# Patient Record
Sex: FEMALE | Race: OTHER | URBAN - METROPOLITAN AREA
[De-identification: names, ages, dates, MRNs, and addresses within clinical notes are randomized per-mention and may not be internally consistent; named-entity substitution may affect disease eponyms.]

---

## 2017-08-15 ENCOUNTER — APPOINTMENT (RX ONLY)
Dept: URBAN - METROPOLITAN AREA CLINIC 70 | Facility: CLINIC | Age: 82
Setting detail: DERMATOLOGY
End: 2017-08-15

## 2017-08-15 ENCOUNTER — APPOINTMENT (RX ONLY)
Dept: URBAN - METROPOLITAN AREA CLINIC 71 | Facility: CLINIC | Age: 82
Setting detail: DERMATOLOGY
End: 2017-08-15

## 2017-08-15 DIAGNOSIS — L29.8 OTHER PRURITUS: ICD-10-CM

## 2017-08-15 DIAGNOSIS — Z71.89 OTHER SPECIFIED COUNSELING: ICD-10-CM

## 2017-08-15 DIAGNOSIS — D69.2 OTHER NONTHROMBOCYTOPENIC PURPURA: ICD-10-CM

## 2017-08-15 DIAGNOSIS — L29.89 OTHER PRURITUS: ICD-10-CM

## 2017-08-15 PROBLEM — I48.91 UNSPECIFIED ATRIAL FIBRILLATION: Status: ACTIVE | Noted: 2017-08-15

## 2017-08-15 PROBLEM — J44.9 CHRONIC OBSTRUCTIVE PULMONARY DISEASE, UNSPECIFIED: Status: ACTIVE | Noted: 2017-08-15

## 2017-08-15 PROBLEM — L40.0 PSORIASIS VULGARIS: Status: ACTIVE | Noted: 2017-08-15

## 2017-08-15 PROBLEM — K21.9 GASTRO-ESOPHAGEAL REFLUX DISEASE WITHOUT ESOPHAGITIS: Status: ACTIVE | Noted: 2017-08-15

## 2017-08-15 PROCEDURE — ? TREATMENT REGIMEN

## 2017-08-15 PROCEDURE — 99213 OFFICE O/P EST LOW 20 MIN: CPT

## 2017-08-15 PROCEDURE — ? COUNSELING

## 2017-08-15 ASSESSMENT — LOCATION SIMPLE DESCRIPTION DERM
LOCATION SIMPLE: RIGHT ELBOW
LOCATION SIMPLE: LEFT THIGH
LOCATION SIMPLE: RIGHT ELBOW
LOCATION SIMPLE: CHEST
LOCATION SIMPLE: RIGHT SHOULDER
LOCATION SIMPLE: ABDOMEN
LOCATION SIMPLE: RIGHT FOREARM
LOCATION SIMPLE: RIGHT POSTERIOR UPPER ARM
LOCATION SIMPLE: POSTERIOR NECK
LOCATION SIMPLE: RIGHT THIGH
LOCATION SIMPLE: RIGHT POSTERIOR THIGH
LOCATION SIMPLE: LEFT ELBOW
LOCATION SIMPLE: RIGHT FOREARM
LOCATION SIMPLE: LEFT UPPER BACK
LOCATION SIMPLE: LEFT UPPER BACK
LOCATION SIMPLE: LEFT WRIST
LOCATION SIMPLE: LEFT ELBOW
LOCATION SIMPLE: RIGHT POSTERIOR THIGH
LOCATION SIMPLE: LEFT THIGH
LOCATION SIMPLE: LEFT FOREHEAD
LOCATION SIMPLE: RIGHT THIGH
LOCATION SIMPLE: LEFT POSTERIOR UPPER ARM
LOCATION SIMPLE: POSTERIOR NECK
LOCATION SIMPLE: LEFT POSTERIOR UPPER ARM
LOCATION SIMPLE: RIGHT SHOULDER
LOCATION SIMPLE: LEFT FOREARM
LOCATION SIMPLE: LEFT FOREHEAD
LOCATION SIMPLE: LEFT WRIST
LOCATION SIMPLE: LEFT FOREARM
LOCATION SIMPLE: CHEST
LOCATION SIMPLE: RIGHT POSTERIOR UPPER ARM
LOCATION SIMPLE: ABDOMEN

## 2017-08-15 ASSESSMENT — LOCATION DETAILED DESCRIPTION DERM
LOCATION DETAILED: LEFT PROXIMAL POSTERIOR UPPER ARM
LOCATION DETAILED: RIGHT DISTAL POSTERIOR UPPER ARM
LOCATION DETAILED: STERNAL NOTCH
LOCATION DETAILED: MID POSTERIOR NECK
LOCATION DETAILED: RIGHT POSTERIOR SHOULDER
LOCATION DETAILED: LEFT PROXIMAL POSTERIOR UPPER ARM
LOCATION DETAILED: LEFT DORSAL WRIST
LOCATION DETAILED: MID POSTERIOR NECK
LOCATION DETAILED: LEFT INFERIOR MEDIAL FOREHEAD
LOCATION DETAILED: LEFT SUPERIOR MEDIAL UPPER BACK
LOCATION DETAILED: EPIGASTRIC SKIN
LOCATION DETAILED: LEFT INFERIOR MEDIAL FOREHEAD
LOCATION DETAILED: LEFT PROXIMAL DORSAL FOREARM
LOCATION DETAILED: LEFT DISTAL DORSAL FOREARM
LOCATION DETAILED: EPIGASTRIC SKIN
LOCATION DETAILED: RIGHT ANTERIOR DISTAL THIGH
LOCATION DETAILED: LEFT DISTAL DORSAL FOREARM
LOCATION DETAILED: LEFT SUPERIOR MEDIAL UPPER BACK
LOCATION DETAILED: RIGHT ELBOW
LOCATION DETAILED: RIGHT PROXIMAL LATERAL POSTERIOR THIGH
LOCATION DETAILED: STERNAL NOTCH
LOCATION DETAILED: LEFT ANTERIOR PROXIMAL THIGH
LOCATION DETAILED: LEFT DORSAL WRIST
LOCATION DETAILED: LEFT ELBOW
LOCATION DETAILED: RIGHT DISTAL POSTERIOR UPPER ARM
LOCATION DETAILED: RIGHT ELBOW
LOCATION DETAILED: RIGHT ANTERIOR DISTAL THIGH
LOCATION DETAILED: RIGHT DISTAL RADIAL DORSAL FOREARM
LOCATION DETAILED: LEFT PROXIMAL DORSAL FOREARM
LOCATION DETAILED: LEFT ELBOW
LOCATION DETAILED: RIGHT DISTAL RADIAL DORSAL FOREARM
LOCATION DETAILED: RIGHT POSTERIOR SHOULDER
LOCATION DETAILED: LEFT ANTERIOR PROXIMAL THIGH
LOCATION DETAILED: RIGHT PROXIMAL LATERAL POSTERIOR THIGH

## 2017-08-15 ASSESSMENT — LOCATION ZONE DERM
LOCATION ZONE: FACE
LOCATION ZONE: TRUNK
LOCATION ZONE: NECK
LOCATION ZONE: LEG
LOCATION ZONE: NECK
LOCATION ZONE: TRUNK
LOCATION ZONE: LEG
LOCATION ZONE: FACE
LOCATION ZONE: ARM
LOCATION ZONE: ARM

## 2017-08-15 NOTE — PROCEDURE: TREATMENT REGIMEN
Plan: Explained stress can play a role in itching. Patient had blood work done with Alanna Rokcwell ([phone])left voicemail to fax blood work over
Initiate Treatment: Benadryl 25mg QHS
Detail Level: Zone

## 2017-08-15 NOTE — PROCEDURE: TREATMENT REGIMEN
Initiate Treatment: Benadryl 25mg QHS
Detail Level: Zone
Plan: Explained stress can play a role in itching. Patient had blood work done with Soha Abarca ([phone])left voicemail to fax blood work over

## 2022-06-15 ENCOUNTER — APPOINTMENT (RX ONLY)
Dept: URBAN - METROPOLITAN AREA CLINIC 71 | Facility: CLINIC | Age: 87
Setting detail: DERMATOLOGY
End: 2022-06-15

## 2022-06-15 DIAGNOSIS — L29.8 OTHER PRURITUS: ICD-10-CM | Status: INADEQUATELY CONTROLLED

## 2022-06-15 DIAGNOSIS — L29.89 OTHER PRURITUS: ICD-10-CM | Status: INADEQUATELY CONTROLLED

## 2022-06-15 DIAGNOSIS — Z71.89 OTHER SPECIFIED COUNSELING: ICD-10-CM

## 2022-06-15 DIAGNOSIS — L85.3 XEROSIS CUTIS: ICD-10-CM

## 2022-06-15 PROCEDURE — ? TREATMENT REGIMEN

## 2022-06-15 PROCEDURE — ? COUNSELING

## 2022-06-15 PROCEDURE — 99203 OFFICE O/P NEW LOW 30 MIN: CPT

## 2022-06-15 PROCEDURE — ? INVENTORY

## 2022-06-15 ASSESSMENT — LOCATION ZONE DERM
LOCATION ZONE: TRUNK
LOCATION ZONE: ARM
LOCATION ZONE: LEG

## 2022-06-15 ASSESSMENT — LOCATION SIMPLE DESCRIPTION DERM
LOCATION SIMPLE: LEFT CALF
LOCATION SIMPLE: LEFT THIGH
LOCATION SIMPLE: RIGHT UPPER BACK
LOCATION SIMPLE: RIGHT FOREARM
LOCATION SIMPLE: LOWER BACK
LOCATION SIMPLE: LEFT FOREARM
LOCATION SIMPLE: ABDOMEN
LOCATION SIMPLE: CHEST

## 2022-06-15 ASSESSMENT — LOCATION DETAILED DESCRIPTION DERM
LOCATION DETAILED: RIGHT PROXIMAL DORSAL FOREARM
LOCATION DETAILED: LEFT PROXIMAL DORSAL FOREARM
LOCATION DETAILED: RIGHT SUPERIOR MEDIAL UPPER BACK
LOCATION DETAILED: LEFT PROXIMAL CALF
LOCATION DETAILED: INFERIOR LUMBAR SPINE
LOCATION DETAILED: RIGHT MEDIAL SUPERIOR CHEST
LOCATION DETAILED: PERIUMBILICAL SKIN
LOCATION DETAILED: LEFT ANTERIOR DISTAL THIGH
LOCATION DETAILED: RIGHT MEDIAL UPPER BACK

## 2022-06-15 NOTE — HPI: ITCHING
What Type Of Note Output Would You Prefer (Optional)?: Standard Output
On A Scale Of 0 To 10 How Would You Rate Your Itching?: 10
How Did Your Itching Occur?: gradual in onset  (over a period of years)
How Severe Is Your Itching?: severe
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 He states he went to an allergist and they were not helpful. Patient bathes with baby soap and dove unscented soap and all free and clear detergent. Patient states she has been on prednisone in the past from her PCP and it does help but as soon as she’s done it comes back.

## 2022-06-15 NOTE — PROCEDURE: TREATMENT REGIMEN
Detail Level: Zone
Initiate Treatment: Claritin QAM\\nBenadryl QHS\\nSarna lotion
Plan: Patient states her kidney function is normal. She states she has had recent bloodwork and everything was WNL. Will sign medical record release today for bloodwork from PCP and Allergist. May consider a therapeutic trial of UVB treatment. Patient will bring records from [facility], she states they diagnosed her with lichen planus years ago.
Continue Regimen: Cerave moisturizing cream BID

## 2022-06-15 NOTE — PROCEDURE: MIPS QUALITY
Quality 110: Preventive Care And Screening: Influenza Immunization: Influenza Immunization Administered during Influenza season
Quality 431: Preventive Care And Screening: Unhealthy Alcohol Use - Screening: Patient not identified as an unhealthy alcohol user when screened for unhealthy alcohol use using a systematic screening method
Detail Level: Detailed
Quality 226: Preventive Care And Screening: Tobacco Use: Screening And Cessation Intervention: Patient screened for tobacco use, is a smoker AND received Cessation Counseling within the Previous 12 Months

## 2022-07-20 ENCOUNTER — APPOINTMENT (RX ONLY)
Dept: URBAN - METROPOLITAN AREA CLINIC 71 | Facility: CLINIC | Age: 87
Setting detail: DERMATOLOGY
End: 2022-07-20

## 2022-07-20 DIAGNOSIS — L29.89 OTHER PRURITUS: ICD-10-CM | Status: INADEQUATELY CONTROLLED

## 2022-07-20 DIAGNOSIS — L29.8 OTHER PRURITUS: ICD-10-CM | Status: INADEQUATELY CONTROLLED

## 2022-07-20 DIAGNOSIS — Z71.89 OTHER SPECIFIED COUNSELING: ICD-10-CM

## 2022-07-20 PROCEDURE — ? ORDER TESTS

## 2022-07-20 PROCEDURE — ? TREATMENT REGIMEN

## 2022-07-20 PROCEDURE — ? COUNSELING

## 2022-07-20 PROCEDURE — 99213 OFFICE O/P EST LOW 20 MIN: CPT

## 2022-07-20 ASSESSMENT — LOCATION DETAILED DESCRIPTION DERM
LOCATION DETAILED: LEFT ANTERIOR DISTAL THIGH
LOCATION DETAILED: INFERIOR LUMBAR SPINE
LOCATION DETAILED: LEFT PROXIMAL CALF
LOCATION DETAILED: RIGHT SUPERIOR MEDIAL UPPER BACK
LOCATION DETAILED: RIGHT MEDIAL SUPERIOR CHEST
LOCATION DETAILED: SUPERIOR THORACIC SPINE
LOCATION DETAILED: PERIUMBILICAL SKIN

## 2022-07-20 ASSESSMENT — LOCATION SIMPLE DESCRIPTION DERM
LOCATION SIMPLE: RIGHT UPPER BACK
LOCATION SIMPLE: LEFT THIGH
LOCATION SIMPLE: ABDOMEN
LOCATION SIMPLE: LOWER BACK
LOCATION SIMPLE: UPPER BACK
LOCATION SIMPLE: LEFT CALF
LOCATION SIMPLE: CHEST

## 2022-07-20 ASSESSMENT — LOCATION ZONE DERM
LOCATION ZONE: TRUNK
LOCATION ZONE: LEG

## 2022-07-20 NOTE — PROCEDURE: ORDER TESTS
Bill For Surgical Tray: no
Performing Laboratory: [id number]
Expected Date Of Service: 07/20/2022
Billing Type: Third-Party Bill
Performing Laboratory: 0

## 2022-07-20 NOTE — PROCEDURE: TREATMENT REGIMEN
Detail Level: Zone
Plan: Patient will sign medical record release for blood work and CT scan done in [facility] two weeks ago.
Discontinue Regimen: Claritin QAM\\nBenadryl QHS (due to making patient sleepy)

## 2022-08-08 ENCOUNTER — APPOINTMENT (RX ONLY)
Dept: URBAN - METROPOLITAN AREA CLINIC 71 | Facility: CLINIC | Age: 87
Setting detail: DERMATOLOGY
End: 2022-08-08

## 2022-08-08 DIAGNOSIS — L29.89 OTHER PRURITUS: ICD-10-CM

## 2022-08-08 DIAGNOSIS — L29.8 OTHER PRURITUS: ICD-10-CM

## 2022-08-08 DIAGNOSIS — Z71.89 OTHER SPECIFIED COUNSELING: ICD-10-CM

## 2022-08-08 PROCEDURE — ? TREATMENT REGIMEN

## 2022-08-08 PROCEDURE — ? COUNSELING

## 2022-08-08 PROCEDURE — 99213 OFFICE O/P EST LOW 20 MIN: CPT

## 2022-08-08 ASSESSMENT — LOCATION DETAILED DESCRIPTION DERM
LOCATION DETAILED: LEFT PROXIMAL CALF
LOCATION DETAILED: RIGHT MEDIAL SUPERIOR CHEST
LOCATION DETAILED: RIGHT SUPERIOR MEDIAL UPPER BACK
LOCATION DETAILED: SUPERIOR THORACIC SPINE
LOCATION DETAILED: INFERIOR LUMBAR SPINE
LOCATION DETAILED: LEFT ANTERIOR DISTAL THIGH
LOCATION DETAILED: PERIUMBILICAL SKIN

## 2022-08-08 ASSESSMENT — LOCATION SIMPLE DESCRIPTION DERM
LOCATION SIMPLE: LEFT CALF
LOCATION SIMPLE: ABDOMEN
LOCATION SIMPLE: CHEST
LOCATION SIMPLE: LEFT THIGH
LOCATION SIMPLE: LOWER BACK
LOCATION SIMPLE: UPPER BACK
LOCATION SIMPLE: RIGHT UPPER BACK

## 2022-08-08 ASSESSMENT — LOCATION ZONE DERM
LOCATION ZONE: TRUNK
LOCATION ZONE: LEG

## 2022-08-08 NOTE — PROCEDURE: TREATMENT REGIMEN
Detail Level: Zone
Plan: Switch from cerave moisturizing cream to cerave ant itch cream.
Otc Regimen: Cerave anti-itch all over.